# Patient Record
Sex: FEMALE | Race: WHITE | URBAN - METROPOLITAN AREA
[De-identification: names, ages, dates, MRNs, and addresses within clinical notes are randomized per-mention and may not be internally consistent; named-entity substitution may affect disease eponyms.]

---

## 2024-10-22 NOTE — PROGRESS NOTES
Sig Start Date End Date Taking? Authorizing Provider   etonogestrel (NEXPLANON) 68 MG implant 68 mg by Subdermal route once   Yes Provider, Devora, MD        Allergies   Allergen Reactions    Labetalol Shortness Of Breath    Penicillins Rash     As a baby       Review of Systems:  A comprehensive review of systems was negative except for that written in the History of Present Illness.    Objective:     Vitals:    10/23/24 0750   BP: 134/88   Weight: 132.8 kg (292 lb 12.8 oz)   Height: 1.651 m (5' 5\")        Physical Exam:  Physical Exam  Constitutional:       General: She is not in acute distress.     Appearance: Normal appearance. She is obese. She is not ill-appearing, toxic-appearing or diaphoretic.   Neurological:      Mental Status: She is alert.         Assessment:   Contraception    Plan:   We had a lengthy discussion regarding options for birth control including OCP, Nuvaring, patch, depo-provera, Nexplanon, Chloé, Mirena, and Paragard.  Patient opts for time to consider.    Risks of uterine perforation, expulsion, the devise becoming embedded, difficulty of placement or removal were reviewed.  Timing of placement and premedication with Cytotec were discussed.  Ibuprofen prior to placement was also encouraged.  We will precertify for the device of her choosing and patient can schedule at her convenience.    Pt states may consider nuvaring, but is leaning twds keeping nexplanon as BTB is not frequent  Discussed timed intercourse/increasing foreplay and increasing emotional intimacy for libido  Wt loss encouraged  Could also refer to counseling  Could try macca root/marychuy also  To call should she desire alternate contraceptive and will move forward accordingly     Diagnosis Orders   1. Pregnancy examination or test, negative result  AMB POC URINE PREGNANCY TEST, VISUAL COLOR COMPARISON      2. Decreased libido        3. Irregular menses        4. Morbid obesity with BMI of 45.0-49.9, adult        5.

## 2024-10-23 ENCOUNTER — OFFICE VISIT (OUTPATIENT)
Dept: OBGYN CLINIC | Age: 29
End: 2024-10-23
Payer: COMMERCIAL

## 2024-10-23 VITALS
HEIGHT: 65 IN | BODY MASS INDEX: 48.78 KG/M2 | SYSTOLIC BLOOD PRESSURE: 134 MMHG | DIASTOLIC BLOOD PRESSURE: 88 MMHG | WEIGHT: 292.8 LBS

## 2024-10-23 DIAGNOSIS — E66.01 MORBID OBESITY WITH BMI OF 45.0-49.9, ADULT: ICD-10-CM

## 2024-10-23 DIAGNOSIS — Z32.02 PREGNANCY EXAMINATION OR TEST, NEGATIVE RESULT: Primary | ICD-10-CM

## 2024-10-23 DIAGNOSIS — R68.82 DECREASED LIBIDO: ICD-10-CM

## 2024-10-23 DIAGNOSIS — N92.6 IRREGULAR MENSES: ICD-10-CM

## 2024-10-23 DIAGNOSIS — Z30.9 ENCOUNTER FOR CONTRACEPTIVE MANAGEMENT, UNSPECIFIED TYPE: ICD-10-CM

## 2024-10-23 LAB
HCG, PREGNANCY, URINE, POC: NEGATIVE
VALID INTERNAL CONTROL, POC: YES

## 2024-10-23 PROCEDURE — 81025 URINE PREGNANCY TEST: CPT | Performed by: NURSE PRACTITIONER

## 2024-10-23 PROCEDURE — 99214 OFFICE O/P EST MOD 30 MIN: CPT | Performed by: NURSE PRACTITIONER

## 2024-10-23 RX ORDER — ETONOGESTREL 68 MG/1
68 IMPLANT SUBCUTANEOUS ONCE
COMMUNITY

## 2024-12-12 NOTE — PROGRESS NOTES
Patient presents today for a routine gynecological examination with no complaints. Notes a few weeks ago stood up and noticed a bump on both upper quadrants of breast tissue. Denies nipple discharge/inversion. Denies redness/dimpling/streaking. Denies fevers. Denies family h/o breast cancer. Drinks 2 cups coffee/day. Does not feel them any more.     OB History          1    Para   1    Term   0       1    AB   0    Living   1         SAB        IAB        Ectopic        Molar        Multiple        Live Births   1              Last pap: 23 (LGSIL, HPV positive, gc/chl/trich neg)- repap 1 year  Last mammogram: N/A  Last colonoscopy: N/A  Last DEXA: N/A    GYN History         Patient's last menstrual period was 2024 (approximate). Has irregular cycles with Nexplanon. States last week she had a few days of light vaginal bleeding.    Past Medical History:  Past Medical History:   Diagnosis Date    Abnormal Papanicolaou smear of cervix     Chlamydia     History of UTI     Hypertension     Pre-eclampsia 2023    Pregnancy     delivery 23    Son born at 30w3d     labor 2023    Vitamin D deficiency        Past Surgical History:  Past Surgical History:   Procedure Laterality Date     SECTION  2023    CHOLECYSTECTOMY  2024    HEENT      Skin Graft- Mouth    WISDOM TOOTH EXTRACTION         Allergies:   Allergies   Allergen Reactions    Labetalol Shortness Of Breath    Penicillins Rash     As a baby       Medication History:  Current Outpatient Medications   Medication Sig Dispense Refill    hydroCHLOROthiazide (HYDRODIURIL) 25 MG tablet Take 1 tablet by mouth daily      rosuvastatin (CRESTOR) 5 MG tablet Take 1 tablet by mouth daily      vitamin D (VITAMIN D3) 50 MCG ( UT) CAPS capsule Take 1 capsule by mouth daily      etonogestrel (NEXPLANON) 68 MG implant 68 mg by Subdermal route once       No current facility-administered medications for this visit.

## 2024-12-16 ENCOUNTER — OFFICE VISIT (OUTPATIENT)
Dept: OBGYN CLINIC | Age: 29
End: 2024-12-16
Payer: COMMERCIAL

## 2024-12-16 VITALS
BODY MASS INDEX: 48.35 KG/M2 | WEIGHT: 290.2 LBS | SYSTOLIC BLOOD PRESSURE: 122 MMHG | DIASTOLIC BLOOD PRESSURE: 98 MMHG | HEIGHT: 65 IN

## 2024-12-16 DIAGNOSIS — Z01.419 WELL WOMAN EXAM: Primary | ICD-10-CM

## 2024-12-16 DIAGNOSIS — Z13.89 SCREENING FOR GENITOURINARY CONDITION: ICD-10-CM

## 2024-12-16 DIAGNOSIS — R87.612 LGSIL ON PAP SMEAR OF CERVIX: ICD-10-CM

## 2024-12-16 DIAGNOSIS — B97.7 HIGH RISK HPV INFECTION: ICD-10-CM

## 2024-12-16 DIAGNOSIS — Z01.419 WELL WOMAN EXAM: ICD-10-CM

## 2024-12-16 LAB
BILIRUBIN, URINE, POC: NEGATIVE
BLOOD URINE, POC: NORMAL
GLUCOSE URINE, POC: NEGATIVE
KETONES, URINE, POC: NEGATIVE
LEUKOCYTE ESTERASE, URINE, POC: NEGATIVE
NITRITE, URINE, POC: NEGATIVE
PH, URINE, POC: 5.5 (ref 4.6–8)
PROTEIN,URINE, POC: 30
SPECIFIC GRAVITY, URINE, POC: 1.03 (ref 1–1.03)
URINALYSIS CLARITY, POC: CLEAR
URINALYSIS COLOR, POC: NORMAL
UROBILINOGEN, POC: NORMAL MG/DL

## 2024-12-16 PROCEDURE — 81002 URINALYSIS NONAUTO W/O SCOPE: CPT | Performed by: NURSE PRACTITIONER

## 2024-12-16 PROCEDURE — 99395 PREV VISIT EST AGE 18-39: CPT | Performed by: NURSE PRACTITIONER

## 2024-12-16 RX ORDER — HYDROCHLOROTHIAZIDE 25 MG/1
25 TABLET ORAL DAILY
COMMUNITY
Start: 2024-11-11

## 2024-12-16 RX ORDER — ROSUVASTATIN CALCIUM 5 MG/1
5 TABLET, COATED ORAL DAILY
COMMUNITY
Start: 2024-11-11

## 2024-12-16 RX ORDER — ACETAMINOPHEN 160 MG
2000 TABLET,DISINTEGRATING ORAL DAILY
COMMUNITY

## 2024-12-23 LAB
COLLECTION METHOD: NORMAL
CYTOLOGIST CVX/VAG CYTO: NORMAL
CYTOLOGY CVX/VAG DOC THIN PREP: NORMAL
DATE OF LMP: NORMAL
HPV REFLEX: NORMAL
Lab: NORMAL
PAP SOURCE: NORMAL
PATH REPORT.FINAL DX SPEC: NORMAL
PREV TREATMENT: NORMAL
STAT OF ADQ CVX/VAG CYTO-IMP: NORMAL

## 2025-05-15 ENCOUNTER — OFFICE VISIT (OUTPATIENT)
Dept: OBGYN CLINIC | Age: 30
End: 2025-05-15
Payer: COMMERCIAL

## 2025-05-15 VITALS
SYSTOLIC BLOOD PRESSURE: 136 MMHG | DIASTOLIC BLOOD PRESSURE: 68 MMHG | HEIGHT: 65 IN | WEIGHT: 293 LBS | BODY MASS INDEX: 48.82 KG/M2

## 2025-05-15 DIAGNOSIS — R10.2 PELVIC PAIN: Primary | ICD-10-CM

## 2025-05-15 PROCEDURE — 99213 OFFICE O/P EST LOW 20 MIN: CPT | Performed by: STUDENT IN AN ORGANIZED HEALTH CARE EDUCATION/TRAINING PROGRAM

## 2025-05-15 RX ORDER — PHENTERMINE HYDROCHLORIDE 37.5 MG/1
37.5 TABLET ORAL DAILY
COMMUNITY
Start: 2025-04-24

## 2025-05-15 NOTE — PROGRESS NOTES
Tiff Matt (: 1995) is a 29 y.o. , Established patient, here for evaluation of the following chief complaint(s): pelvic      HPI:  Pt has Nexplanon - inserted on 12/3/23    Pt states she has had pain in her lower pelvic/legs all over - states its worse during intercourse.   Pt states this started last year.       ASSESSMENT/PLAN:  1. Pelvic pain  Overview:  5/15/25: pelvic pain during IC, worsening for the last year. Pain is in her pelvis, hips, and lower legs, but mostly in her groin area bilaterally. She had a CS 18 months ago. Does not occur with every IC episode. Only occurs if she is on her back with her hips  and hyperflexed. No lingering pain after IC. Pain is dull but then worsens. Sometimes she has to have him stop during IC because her legs hurt and she has to straighten her legs. One time she was unable to close her legs and her partner had to help her close her legs. She has Nexplanon in place and rarely has periods.  Pelvic pain exam: normal  Likely musculoskeletal or nerve pain from hip hyperflexion  Plan:  Discussed avoiding hyperflexion of hips during IC       Return if symptoms worsen or fail to improve.    OBJECTIVE:  /68   Ht 1.651 m (5' 5\")   Wt 135.5 kg (298 lb 11.2 oz)   LMP 2025 (Approximate)   BMI 49.71 kg/m²      Past Medical History:   Diagnosis Date    Abnormal Papanicolaou smear of cervix     Chlamydia     History of UTI     Hypertension     Pre-eclampsia 2023    Pregnancy     delivery 23    Son born at 30w3d     labor 2023    Vitamin D deficiency       Current Outpatient Medications   Medication Sig Dispense Refill    phentermine (ADIPEX-P) 37.5 MG tablet Take 1 tablet by mouth daily.      hydroCHLOROthiazide (HYDRODIURIL) 25 MG tablet Take 1 tablet by mouth daily      rosuvastatin (CRESTOR) 5 MG tablet Take 1 tablet by mouth daily      vitamin D (VITAMIN D3) 50 MCG (2000) CAPS capsule Take 1 capsule by